# Patient Record
Sex: MALE
[De-identification: names, ages, dates, MRNs, and addresses within clinical notes are randomized per-mention and may not be internally consistent; named-entity substitution may affect disease eponyms.]

---

## 2022-06-16 ENCOUNTER — HOSPITAL ENCOUNTER (OUTPATIENT)
Dept: HOSPITAL 97 - 2ND | Age: 67
Setting detail: OBSERVATION
LOS: 1 days | Discharge: HOME | End: 2022-06-17
Attending: INTERNAL MEDICINE | Admitting: INTERNAL MEDICINE
Payer: SELF-PAY

## 2022-06-16 VITALS — OXYGEN SATURATION: 92 %

## 2022-06-16 VITALS — BODY MASS INDEX: 31.8 KG/M2

## 2022-06-16 DIAGNOSIS — Z82.49: ICD-10-CM

## 2022-06-16 DIAGNOSIS — E78.2: ICD-10-CM

## 2022-06-16 DIAGNOSIS — Z79.82: ICD-10-CM

## 2022-06-16 DIAGNOSIS — N40.0: ICD-10-CM

## 2022-06-16 DIAGNOSIS — M25.062: Primary | ICD-10-CM

## 2022-06-16 DIAGNOSIS — M25.462: ICD-10-CM

## 2022-06-16 DIAGNOSIS — Z87.891: ICD-10-CM

## 2022-06-16 DIAGNOSIS — M23.222: ICD-10-CM

## 2022-06-16 DIAGNOSIS — Z95.1: ICD-10-CM

## 2022-06-16 DIAGNOSIS — S83.412A: ICD-10-CM

## 2022-06-16 DIAGNOSIS — I25.10: ICD-10-CM

## 2022-06-16 DIAGNOSIS — Z85.828: ICD-10-CM

## 2022-06-16 DIAGNOSIS — Z20.822: ICD-10-CM

## 2022-06-16 DIAGNOSIS — Z91.041: ICD-10-CM

## 2022-06-16 DIAGNOSIS — Z88.6: ICD-10-CM

## 2022-06-16 DIAGNOSIS — Z80.0: ICD-10-CM

## 2022-06-16 DIAGNOSIS — Z79.899: ICD-10-CM

## 2022-06-16 DIAGNOSIS — I10: ICD-10-CM

## 2022-06-16 PROCEDURE — 80053 COMPREHEN METABOLIC PANEL: CPT

## 2022-06-16 PROCEDURE — 73721 MRI JNT OF LWR EXTRE W/O DYE: CPT

## 2022-06-16 PROCEDURE — 71046 X-RAY EXAM CHEST 2 VIEWS: CPT

## 2022-06-16 PROCEDURE — 36415 COLL VENOUS BLD VENIPUNCTURE: CPT

## 2022-06-16 PROCEDURE — 81003 URINALYSIS AUTO W/O SCOPE: CPT

## 2022-06-16 PROCEDURE — 85610 PROTHROMBIN TIME: CPT

## 2022-06-16 PROCEDURE — 73560 X-RAY EXAM OF KNEE 1 OR 2: CPT

## 2022-06-16 PROCEDURE — 85025 COMPLETE CBC W/AUTO DIFF WBC: CPT

## 2022-06-16 PROCEDURE — 85730 THROMBOPLASTIN TIME PARTIAL: CPT

## 2022-06-16 PROCEDURE — 84550 ASSAY OF BLOOD/URIC ACID: CPT

## 2022-06-16 RX ADMIN — DEXTROSE AND SODIUM CHLORIDE SCH MLS: 5; .9 INJECTION, SOLUTION INTRAVENOUS at 22:39

## 2022-06-16 RX ADMIN — MORPHINE SULFATE PRN MG: 2 INJECTION, SOLUTION INTRAMUSCULAR; INTRAVENOUS at 22:38

## 2022-06-17 VITALS — SYSTOLIC BLOOD PRESSURE: 129 MMHG | DIASTOLIC BLOOD PRESSURE: 66 MMHG | TEMPERATURE: 96.7 F

## 2022-06-17 LAB
ALBUMIN SERPL BCP-MCNC: 3 G/DL (ref 3.4–5)
ALP SERPL-CCNC: 75 U/L (ref 45–117)
ALT SERPL W P-5'-P-CCNC: 25 U/L (ref 12–78)
AST SERPL W P-5'-P-CCNC: 15 U/L (ref 15–37)
BUN BLD-MCNC: 26 MG/DL (ref 7–18)
GLUCOSE SERPLBLD-MCNC: 106 MG/DL (ref 74–106)
HCT VFR BLD CALC: 42.8 % (ref 39.6–49)
INR BLD: 1.03
LYMPHOCYTES # SPEC AUTO: 2.8 K/UL (ref 0.7–4.9)
PMV BLD: 7.4 FL (ref 7.6–11.3)
POTASSIUM SERPL-SCNC: 3.9 MMOL/L (ref 3.5–5.1)
RBC # BLD: 4.72 M/UL (ref 4.33–5.43)
UA DIPSTICK W REFLEX MICRO PNL UR: (no result)

## 2022-06-17 RX ADMIN — DEXTROSE AND SODIUM CHLORIDE SCH: 5; .9 INJECTION, SOLUTION INTRAVENOUS at 12:18

## 2022-06-17 RX ADMIN — MORPHINE SULFATE PRN MG: 2 INJECTION, SOLUTION INTRAMUSCULAR; INTRAVENOUS at 09:38

## 2022-06-17 RX ADMIN — MORPHINE SULFATE PRN MG: 2 INJECTION, SOLUTION INTRAMUSCULAR; INTRAVENOUS at 13:47

## 2022-06-17 NOTE — RAD REPORT
EXAM DESCRIPTION:  RAD - Chest Pa And Lat (2 Views) - 6/17/2022 5:26 am

 

CLINICAL HISTORY:  left knee derangement, chest pain, preprocedure exam

 

COMPARISON:  Two view chest 11/16/2020

 

TECHNIQUE:  Frontal and lateral views of the chest were obtained.

 

FINDINGS:  The lungs are clear.   Heart size is normal and central vasculature is within normal limit
s.  No pleural effusion or pneumothorax seen.  No acute bony finding noted.  No aortic abnormality.  
No significant change from comparison study.

 

IMPRESSION:  No acute cardiopulmonary process.

## 2022-06-17 NOTE — CON
Reason For Consultation:  Intractable knee pain.



History Of Present Illness:  Mr. Ramos is a 66-year-old gentleman with some comorbidities who has ha
d progressive knee pain after an injury, now unbearable in nature.  He was admitted last night for pa
in control.  We were asked to consult for this.  He is remaining n.p.o. pending any possible surgery.
  He is currently in the MRI scanner, which I am in agreement with obtaining the study.  He will proc
eed with recommendations based on MRI findings.  We will send a uric acid just on the off chance that
 he does have gout.  It is not uncommon that injury can precipitate a gouty arthropathy.





MALENA/MODL

DD:  06/17/2022 07:47:31Voice ID:  [id number]

DT:  06/17/2022 11:07:06Report ID:  [id number]

## 2022-06-17 NOTE — SS
Date of Discharge:  06/17/2022



Chief Complaint:  Left knee pain.



History Of Present Illness:  This is a 66-year-old very pleasant male patient, who unfortunately twis
jocelin his left knee while he was trying to get up from the chair and he got his foot tangled up over a 
cord and twisted his knee where he started to have significant pain in his left knee.  [facility] where he had an x-ray of the left knee and no fracture was found.  He was released 
to go home with Medrol Dosepak and tramadol as needed for pain.  After his visit to the emergency [facility], he came to see me and he was advised to take his medication as prescribed from the emergency room 
and come see me in a week for followup.  The patient came to office yesterday and reported that his p
ain was not improving and he started to have some swelling on the medial aspect of the left knee.  He
 has constant pain, but the pain tends to get worse with standing or walking.  His pain got worse yes
terday after I saw him and decision was made to admit him to hospital for further evaluation and benjamin
gement of this problem.  It is important to know that after calling multiple orthopedic specialists, 
we were not able to get any appointment for any orthopedic specialist to see him over next 3-4 weeks.
  I saw him in this hospital, he was sleeping, easily arousable, not in any distress.



Allergies:  TO CODEINE AND IODINE.



Medications:  

1.Amlodipine 10 mg daily.

2.Aspirin 325 mg daily.

3.Atorvastatin 80 mg daily.

4.Vitamin D3 1000 units daily.

5.Metoprolol tartrate 50 mg 2 times a day.

6.Magnesium oxide 400 mg daily.

7.Nitrostat p.r.n.

8.Potassium chloride 99 mg daily.

9.Sertraline 50 mg daily.



Review of Systems:

Musculoskeletal:  Left knee pain. 

All other systems reviewed and negative.



Past Medical History:  Significant for hypertension; hyperlipidemia, which is mixed; coronary artery 
disease; benign prostatic hypertrophy; basal cell carcinoma.



Past Surgical History:  Coronary artery bypass surgery done in July 2019.



Family History:  Father and mother both have hypertension.  Brother with history of colon cancer.



Social History:  Prior history of smoking, not at present time.  Use of alcohol, occasional.



Physical Examination:

Vital Signs:  His height 5 feet 5 inches, weight 191 pounds, temperature 97.5, pulse 52, respiratory 
rate 18, blood pressure 129/67, and oxygen saturation 95%. 

General:  Awake, alert, oriented, not in distress. 

HEENT:  Head atraumatic, normocephalic.  Conjunctivae nonerythematous.  Sclerae white.  Mouth, no thr
ush or edema noted.  Ears/Nose, no mass, lesion, discharge noted. 

Neck:  Supple. No JVD, lymph nodes, bruit, thyromegaly noted. 

Lungs:  Bilateral good equal air entry. Clear to auscultation. No rhonchi.  No rales. 

Heart:  Normal heart sounds, no murmur or gallop. 

Abdomen:  Soft, bowel sounds normal. No guarding, rigidity, tenderness, mass, hepatosplenomegaly, dis
tention, or bruit noted. 

Extremities:  No leg edema.  No calf tenderness. 

Skin:  No rash, ulcer, cellulitis. 

Lymphatics:  No lymph node enlargement in neck, supraclavicular, infraclavicular region. 

Neuro:  No focal neurological deficit. 

Chest:  Unremarkable. 

External Genitalia:  Deferred. 

Rectal:  Deferred. 

Musculoskeletal:  The patient has significant swelling of the left medial knee with painful range of 
motion.  He has large area of bruising involving left medial thigh in mid and lower 1/3rd aspect and 
also has some bruising on the left lower leg around the ankle and foot on the medial region.



Laboratory Data:  COVID-19 test negative.  Liver function tests unremarkable.  Urinalysis normal.  Wh
ite count 9.2, hemoglobin 15.1, platelets 288.  Sodium 137, potassium 3.9, chloride 107, bicarb 27, B
UN 26, creatinine 1.13, glucose 106.  PT and PTT normal.  Chest x-ray unremarkable.  Left knee x-ray,
 no acute traumatic injury.  MRI of the left knee done today shows medial collateral ligament tear is
 suspected, medial patellofemoral ligament tear cannot be excluded.  Large joint effusion.  Posterior
 horn midbody medial meniscus tear with no free or displaced meniscal tissue, posterior cruciate is s
uspected to be chronic.



Hospital Course:  After the patient was admitted to the hospital, orthopedic consultation was request
ed from Dr. Zarco and details were discussed with him.  After an MRI was done, Dr. Zarco review
ed MRI and recommended that the patient does not need any surgical intervention and he will not recom
mend any intra-articular steroid injection either because of increased risk of infection.  The patien
t probably has some hemarthrosis with this joint effusion from this trauma and conservative managemen
t was recommended by Dr. Zarco.  Details were discussed with the patient and there is no need for 
ongoing hospitalization now, so the patient was discharged to go home and I have instructed him to ap
ply ice off and on to the left knee and an ACE wrap was placed by nursing staff per order to the left
 knee and he was instructed to continue to use that and I will see him next week at office for follow
up.  The patient is requesting refill on his pain medication, tramadol 50 mg 4 times a day as needed 
for pain, 30 tablets prescription was sent to [facility] and I have also advised him
 to use meloxicam 15 mg by mouth daily and prescription was sent for 30 tablets.



Final Diagnoses:  

1.Hemarthrosis, left knee.

2.Medial collateral ligament tear, left knee.

3.Posterior horn medial meniscus tear.

4.Coronary artery disease.

5.Hypertension.

6.Hyperlipidemia.

7.Benign prostatic hypertrophy.





MINO/MODL

DD:  06/17/2022 15:25:30Voice ID:  [id number]

DT:  06/17/2022 19:56:24Report ID:  [id number]

## 2022-06-17 NOTE — RAD REPORT
EXAM DESCRIPTION:  RAD - Knee Left 2 View - 6/17/2022 4:58 am

 

CLINICAL HISTORY:  Left knee derangement, knee pain

 

COMPARISON:  No comparisons

 

FINDINGS:  No fracture, dislocation or periosteal reaction.Small joint effusion is present. Slight na
rrowing of the lateral compartment noted. Minimal lateral compartment marginal spurs are present. Sma
ll spurs are present at the margins of the patella articular surface. Soft tissues along the anterior
 and medial margin knee joint are prominent with the baseline for the patient unknown. Surgical clips
 are seen presumed to be from pain harvesting. Arterial calcifications are present.

 

 

IMPRESSION:  Knee joint degenerative changes are present along with small joint effusion.

 

Clinical concerns for internal derangement or occult bony injury could be further assessed with MR im
aging.

## 2022-06-17 NOTE — RAD REPORT
EXAM DESCRIPTION:  MRI - Knee Left Wo Cont - 6/17/2022 8:09 am

 

CLINICAL HISTORY:  Anteromedial knee pain, recent knee twisting injury, no recent surgery

 

COMPARISON:  Two view left knee same date

 

TECHNIQUE:  Sagittal and axial PD and T2 fat sat sequences obtained along with coronal T1 and T2 fat 
sat sequences.

 

FINDINGS:  No occult fracture present. No marrow replacing process identifiable. Approximately 15 mil
limeter area of bone bruising or focal edema seen in the lateral most aspect of the lateral femoral c
ondyle. Adjacent cortex is intact. Small areas of subcortical degenerative cystic change seen along t
he undersurface of the medial and lateral tibial plateaus and the tibial spine. A 2.5 centimeter area
 of mild bone bruising seen in the posterior aspect of the lateral femoral condyle. Again, the adjace
nt cortex is intact.

 

Mild chondral edema noted in the lateral patella facet. Femoral chondromalacia changes are present. N
o full-thickness osteochondral defect identified.

 

Anterior cruciate ligament is intact. There is a full-thickness posterior cruciate ligament tear. Dis
siobhan fibers are not well visualized. This could be a chronic PCL injury. No lateral collateral ligamen
t tear seen. Medial collateral ligament tear is evident involving proximal fibers anterior margin.

 

Quadriceps tendon and tendon insertion to the patella are unremarkable. Patella tendon, origin and in
sertion are normal.

 

Horizontal tear in the posterior horn and mid body of the medial meniscus. Tear extends to the inferi
or articular surface near the free edge. No free or displaced meniscal tissue. Lateral meniscus degen
erative signal is present without a tear confirmed.

 

A large joint effusion is present. There are hypointense T1 and T2 strands throughout the joint effus
ion with fluid extending into the medial soft tissues adjacent to the medial tibial plateau and media
l femoral condyle. Medial patellofemoral ligament cannot be confirmed as intact.

 

IMPRESSION:  Medial collateral ligament tear is suspected along the anterior margin of the proximal f
ibers. This appears to involve both superficial and deep fibers.

 

Medial patellofemoral ligament tear cannot be excluded.

 

Large joint effusion with fluid extending into the soft tissues along the medial margin of the medial
 femoral condyle and medial tibial plateau. Hemorrhagic debris within the joint effusion suspected.

 

Posterior horn - mid body medial meniscus tear with no free or displaced meniscal tissue.

 

Posterior cruciate ligament tear is suspected to be chronic.

## 2023-08-13 ENCOUNTER — HOSPITAL ENCOUNTER (EMERGENCY)
Dept: HOSPITAL 97 - ER | Age: 68
Discharge: ANOTHER HOSPITAL | End: 2023-08-13
Payer: COMMERCIAL

## 2023-08-13 VITALS — OXYGEN SATURATION: 99 %

## 2023-08-13 VITALS — SYSTOLIC BLOOD PRESSURE: 148 MMHG | DIASTOLIC BLOOD PRESSURE: 74 MMHG

## 2023-08-13 VITALS — TEMPERATURE: 97.3 F

## 2023-08-13 DIAGNOSIS — Z79.82: ICD-10-CM

## 2023-08-13 DIAGNOSIS — R07.9: ICD-10-CM

## 2023-08-13 DIAGNOSIS — Z91.048: ICD-10-CM

## 2023-08-13 DIAGNOSIS — I10: ICD-10-CM

## 2023-08-13 DIAGNOSIS — Z98.61: ICD-10-CM

## 2023-08-13 DIAGNOSIS — I72.4: Primary | ICD-10-CM

## 2023-08-13 DIAGNOSIS — Z88.5: ICD-10-CM

## 2023-08-13 LAB
ALBUMIN SERPL BCP-MCNC: 3.4 G/DL (ref 3.4–5)
ALP SERPL-CCNC: 74 U/L (ref 45–117)
ALT SERPL W P-5'-P-CCNC: 21 U/L (ref 16–61)
AST SERPL W P-5'-P-CCNC: 20 U/L (ref 15–37)
BILIRUB INDIRECT SERPL-MCNC: 0.7 MG/DL (ref 0.2–0.8)
BUN BLD-MCNC: 21 MG/DL (ref 7–18)
GLUCOSE SERPLBLD-MCNC: 117 MG/DL (ref 74–106)
HCT VFR BLD CALC: 31.8 % (ref 39.6–49)
INR BLD: 1.13
LYMPHOCYTES # SPEC AUTO: 2.2 K/UL (ref 0.7–4.9)
MAGNESIUM SERPL-MCNC: 2 MG/DL (ref 1.6–2.4)
MCV RBC: 89.7 FL (ref 80–100)
NT-PROBNP SERPL-MCNC: 345 PG/ML (ref ?–125)
PMV BLD: 8.1 FL (ref 7.6–11.3)
POTASSIUM SERPL-SCNC: 3.5 MEQ/L (ref 3.5–5.1)
RBC # BLD: 3.55 M/UL (ref 4.33–5.43)
TROPONIN I SERPL HS-MCNC: 12.8 PG/ML (ref ?–58.9)
WBC # BLD AUTO: 8.5 THOU/UL (ref 4.3–10.9)

## 2023-08-13 PROCEDURE — 80048 BASIC METABOLIC PNL TOTAL CA: CPT

## 2023-08-13 PROCEDURE — 80076 HEPATIC FUNCTION PANEL: CPT

## 2023-08-13 PROCEDURE — 83880 ASSAY OF NATRIURETIC PEPTIDE: CPT

## 2023-08-13 PROCEDURE — 71045 X-RAY EXAM CHEST 1 VIEW: CPT

## 2023-08-13 PROCEDURE — 36415 COLL VENOUS BLD VENIPUNCTURE: CPT

## 2023-08-13 PROCEDURE — 85610 PROTHROMBIN TIME: CPT

## 2023-08-13 PROCEDURE — 93971 EXTREMITY STUDY: CPT

## 2023-08-13 PROCEDURE — 74176 CT ABD & PELVIS W/O CONTRAST: CPT

## 2023-08-13 PROCEDURE — 99285 EMERGENCY DEPT VISIT HI MDM: CPT

## 2023-08-13 PROCEDURE — 93926 LOWER EXTREMITY STUDY: CPT

## 2023-08-13 PROCEDURE — 93005 ELECTROCARDIOGRAM TRACING: CPT

## 2023-08-13 PROCEDURE — 83735 ASSAY OF MAGNESIUM: CPT

## 2023-08-13 PROCEDURE — 85025 COMPLETE CBC W/AUTO DIFF WBC: CPT

## 2023-08-13 PROCEDURE — 84484 ASSAY OF TROPONIN QUANT: CPT

## 2023-08-13 NOTE — RAD REPORT
EXAM DESCRIPTION:  Jake Single View8/13/2023 3:53 pm

 

CLINICAL HISTORY:  Chest pain

 

COMPARISON:  2022

 

FINDINGS:   The lungs appear clear of acute infiltrate. The heart is normal size.

 

Postsurgical changes involve the chest

 

IMPRESSION:  No acute abnormalities displayed

## 2023-08-13 NOTE — RAD REPORT
EXAM DESCRIPTION:  CT - Abdomen   Pelvis Wo Contrast - 8/13/2023 5:11 pm

 

CLINICAL HISTORY:  Abdominal  pain /right groin pain

 

COMPARISON:  2010

 

TECHNIQUE:  Computed axial tomography of the abdomen and pelvis was obtained. IV and oral contrast we
re not requested.

 

All CT scans are performed using dose optimization technique as appropriate and may include automated
 exposure control or mA/KV adjustment according to patient size.

 

FINDINGS:   The evaluation of solid organs, vessels and bowel is limited secondary to the lack of con
trast administration.

 

Liver, pancreas and adrenals grossly normal

 

Curvilinear calcification splenic capsule unchanged from prior exam. Spleen grossly normal.

 

Multiple, bilateral renal calculi. Mild right hydronephrosis. 8 millimeter calculus distal right uret
er.

 

No evidence diverticulitis. Normal appendix

 

Stranding right inguinal region status post recent catheterization. 2 centimeter round structure ante
rior to the right common femoral artery

 

Small umbilical hernia. Spondylosis lumbar spine

 

IMPRESSION:  8 millimeter calculus distal right ureter resulting in mild right hydronephrosis

 

2 centimeter round structure right inguinal region is shown to represent a pseudo aneurysm on a vascu
lar ultrasound August 13, 2023

## 2023-08-13 NOTE — RAD REPORT
EXAM DESCRIPTION:  US - Lower Extremity Artery Uni Ltd - 8/13/2023 4:42 pm

 

CLINICAL HISTORY:  Leg pain

 

COMPARISON:  None

 

FINDINGS:

2 centimeter vascular structure lies anterior to right common femoral artery having the appearance of
 a pseudo aneurysm.

 

The right common femoral, superficial femoral, right popliteal, right dorsalis pedis and right poster
ior tibial arteries are patent demonstrating monophasic waveforms

 

Grayscale, color and spectral analysis performed on all vessels

 

IMPRESSION:

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

## 2023-08-13 NOTE — RAD REPORT
EXAM DESCRIPTION:  USExtremjose ramon Venous Uni Ltd8/13/2023 4:42 pm

 

CLINICAL HISTORY:  Right leg pain and swelling.

 

COMPARISON:  None.

 

FINDINGS:  Right common femoral, superficial femoral, greater saphenous, popliteal and right posterio
r tibial veins are compressible and demonstrate augmentation. Doppler demonstrates good flow.

 

Grayscale, color and spectral analysis performed on all vessels

 

IMPRESSION:  No evidence of deep venous thrombosis involving the right lower extremity.

## 2023-08-13 NOTE — EDPHYS
Physician Documentation                                                                           

 [facility]                                                                 

Name: Walter Ramos                                                                               

Age: 67 yrs                                                                                       

Sex: Male                                                                                         

: 1955                                                                                   

MRN: [id number]                                                                                   

Arrival Date: 2023                                                                          

Time: 14:57                                                                                       

Account#: [id number]                                                                            

Bed 7                                                                                             

Private MD:                                                                                       

ED Physician Devonte Liang                                                                       

HPI:                                                                                              

                                                                                             

16:29 This 67 yrs old Male presents to ER via Ambulatory with complaints of Pelvic Pain, Leg  sb4 

      Pain, Hip Pain.                                                                             

16:29 Patient had a cardiac catheterization done at [facility] 3 days ago. He states  sb4 

      they first attempted the radial artery and were unsuccessful and then attempted the         

      femoral artery and again were unsuccessful. He states that when they pulled the line,       

      they had to hold pressure for almost an hour to control the bleeding. He was observed       

      overnight and discharged home on Plavix. He states that the bruising on his leg has         

      slowly gotten worse. It is on his entire medial right thigh. He endorses pain in the        

      area and does endorse some chest pain and dyspnea on exertion.                              

                                                                                                  

Historical:                                                                                       

- Allergies:                                                                                      

15:16 Iodine;                                                                                 ph  

15:16 Codeine;                                                                                ph  

- Home Meds:                                                                                      

15:16 metoprolol tartrate 50 mg Oral tablet 2 times per day [Active]; sertraline 100 mg oral  ph  

      tablet daily [Active]; amlodipine 10 mg tablet daily [Active]; An Low Dose Aspirin       

      81 mg oral tablet, delayed release (enteric coated) once [Active]; Potassium Chloride       

      Oral [Active]; clopidogrel 75 mg oral tablet daily [Active];                                

- PMHx:                                                                                           

15:16 Hypertensive disorder;                                                                  ph  

                                                                                                  

- Immunization history:: Adult Immunizations unknown.                                             

- Social history:: Smoking status: Patient denies any tobacco usage or history of.                

                                                                                                  

                                                                                                  

ROS:                                                                                              

16:29 Constitutional: Negative for fever, chills, and weight loss, Neuro: Negative for        sb4 

      headache, weakness, numbness, tingling, and seizure.                                        

16:29 Cardiovascular: Positive for chest pain.                                                    

16:29 Respiratory: Positive for dyspnea on exertion.                                              

16:29 Skin: Positive for ecchymosis, of the right quadriceps and right upper thigh and medial     

      aspect of right thigh and right inner thigh and right hamstring and right gluteal fold      

      and right femoral area.                                                                     

16:29 All other systems are negative.                                                             

                                                                                                  

Exam:                                                                                             

16:17 ECG was reviewed by the Attending Physician.                                            sb4 

16:33 Constitutional:  This is a well developed, well nourished patient who is awake, alert,  sb4 

      and in no acute distress. Head/Face:  Normocephalic, atraumatic. Eyes:  Extra-ocular        

      motions intact.  Periorbital areas with no swelling, redness, or edema. ENT:  Mucous        

      membranes moist. Cardiovascular:  Regular rate and rhythm with a normal S1 and S2.          

      Respiratory:  Lungs have equal breath sounds bilaterally, clear to auscultation and         

      percussion.  No rales, rhonchi or wheezes noted.  No increased work of breathing, no        

      retractions or nasal flaring. Abdomen/GI:  Soft, non-tender, no distension. MS/             

      Extremity:  Pulses equal, no cyanosis.  Neurovascular intact.  Full, normal range of        

      motion. Neuro:  Awake and alert, GCS 15, oriented to person, place, time, and               

      situation.  Cranial nerves II-XII grossly intact.  Motor strength 5/5 in all                

      extremities.  Sensory grossly intact.  Cerebellar exam normal.  Normal gait.                

16:33 Skin: Appearance: ecchymosis, noted on the, groin and right quadriceps and right upper      

      thigh and medial aspect of right thigh and right inner thigh and right hamstring and        

      right gluteal fold and right femoral area, that are moderate.                               

                                                                                                  

Vital Signs:                                                                                      

15:13  / 77; Pulse 58; Resp 18; Temp 97.3; Pulse Ox 100% on R/A; Weight 91.63 kg;       ph  

      Height 5 ft. 7 in. ;                                                                        

16:00  / 75; Pulse 59; Resp 18; Pulse Ox 98% on R/A;                                    ph  

17:30  / 78; Pulse 57; Resp 16; Pulse Ox 98% on R/A;                                    ph  

18:48  / 81; Pulse 55; Resp 16; Pulse Ox 99% on R/A;                                    ph  

19:45  / 74; Pulse 56; Resp 16; Pulse Ox 99% on R/A;                                    jb4 

20:19  / 74; Pulse 51; Resp 18; Pulse Ox 99% on R/A;                                    jb4 

15:13 Body Mass Index 31.64 (91.63 kg, 170.18 cm)                                             ph  

                                                                                                  

MDM:                                                                                              

15:06 Patient medically screened.                                                             sb4 

17:43 Data reviewed: vital signs, nurses notes, lab test result(s), radiologic studies, I     sb4 

      have discussed the patient's presentation/case with the attending Emergency Department      

      Physician;. Historians other than the Patient: Spouse/Significant Other: wife. Care         

      significantly affected by the following chronic conditions: Hypertension, CAD.              

      Counseling: I had a detailed discussion with the patient and/or guardian regarding: the     

      historical points, exam findings, and any diagnostic results supporting the                 

      discharge/admit diagnosis, lab results, radiology results, the need to transfer to          

      another facility, for higher level of care, [facility] does not           

      immediately have the required specialist.                                                   

19:33 Management of patient was discussed with the following: Hospitalist: Hospitalist at     [address]                                                                                  

                                                                                                  

                                                                                             

15:19 Order name: Basic Metabolic Panel; Complete Time: 16:20                                 [facility] 

                                                                                             

15:19 Order name: CBC with Diff; Complete Time: 16:20                                         [facility] 

                                                                                             

15:19 Order name: LFT's; Complete Time: 16:20                                                 [facility] 

                                                                                             

15:19 Order name: Magnesium; Complete Time: 16:20                                             [facility] 

                                                                                             

15:19 Order name: NT PRO-BNP; Complete Time: 16:20                                            [facility] 

                                                                                             

15:19 Order name: PT-INR; Complete Time: 16:20                                                [facility] 

                                                                                             

15:19 Order name: Troponin HS; Complete Time: 16:20                                           [facility] 

                                                                                             

15:19 Order name: XRAY Chest (1 view); Complete Time: 16:26                                   [facility] 

                                                                                             

15:19 Order name: Extremity Venous Uni Ltd US; Complete Time: 17:31                           [facility] 

                                                                                             

15:19 Order name: Lower Extremity Artery Uni Ltd US; Complete Time: 17:31                     [facility] 

                                                                                             

16:26 Order name: CT Abd/Pelvis - Without Contrast; Complete Time: 17:34                      4 

                                                                                             

15:19 Order name: EKG; Complete Time: 15:20                                                   [facility] 

                                                                                             

15:19 Order name: Cardiac monitoring; Complete Time: 15:48                                    [facility] 

                                                                                             

15:19 Order name: EKG - Nurse/Tech; Complete Time: 15:48                                      [facility] 

                                                                                             

15:19 Order name: IV Saline Lock; Complete Time: 15:48                                        [facility] 

                                                                                             

15:19 Order name: Labs collected and sent; Complete Time: 15:48                               [facility] 

                                                                                             

15:19 Order name: O2 Per Protocol; Complete Time: 15:48                                       [facility] 

                                                                                             

15:19 Order name: O2 Sat Monitoring; Complete Time: 15:48                                     sb4 

                                                                                                  

EC:17 Rate is 59 beats/min. Rhythm is regular, Sinus bradycardia with Right bundle branch     sb4 

      block. [place] interval is prolonged at 210 msec. QRS interval is normal at 98 msec. QT          

      interval is normal at 426 msec. Interpreted by me. Reviewed by me.                          

                                                                                                  

Administered Medications:                                                                         

No medications were administered                                                                  

                                                                                                  

                                                                                                  

Disposition Summary:                                                                              

23 17:44                                                                                    

Transfer Ordered                                                                                  

      Transfer Location: [facility]                                                     sb4 

      Reason: Higher level of care                                                            sb4 

      Condition: Fair                                                                         sb4 

      Problem: new                                                                            sb4 

      Symptoms: are unchanged                                                                 sb4 

      Accepting Physician: Dr. Vivas(23 20:31)                                          jb4 

      Diagnosis                                                                                   

        - pseudoaneursym of right femoral artery, 2 cm, s/p cardiac catheterization           sb4 

      Forms:                                                                                      

        - Medication Reconciliation Form                                                      sb4 

        - SBAR form                                                                           sb4 

Signatures:                                                                                       

Dispatcher [employer]                           EDCherelle Alfred RN RN ph Bryson, James, RN                       RN   jbRuby Alicia PA-C PA-C sb4                                                  

                                                                                                  

Corrections: (The following items were deleted from the chart)                                    

15:19 15:16 Allergies: clopidogrel; ph                                                        ph  

16:19 16:17 Rate is 59 beats/min. Rhythm is regular, Sinus bradycardia with Right bundle      sb4 

      branch block. [place] interval is prolonged at 210 msec. QRS interval is normal at 98 msec.      

      QT interval is normal at 426 msec. Clinical impression: Abnormal EKG without                

      significant change. Interpreted by me. Reviewed by me. sb4                                  

19:33 17:44 Dr. Amanda sb4                                                                      sb4 

20:31 19:33 Dr. Vivas sb4                                                                     jb4 

                                                                                                  

**************************************************************************************************

## 2023-08-13 NOTE — ER
Nurse's Notes                                                                                     

 [facility]                                                                 

Name: Walter Ramos                                                                               

Age: 67 yrs                                                                                       

Sex: Male                                                                                         

: 1955                                                                                   

MRN: [id number]                                                                                   

Arrival Date: 2023                                                                          

Time: 14:57                                                                                       

Account#: [id number]                                                                            

Bed 7                                                                                             

Private MD:                                                                                       

Diagnosis: pseudoaneursym of right femoral artery, 2 cm, s/p cardiac catheterization              

                                                                                                  

Presentation:                                                                                     

                                                                                             

15:13 Chief complaint: Patient states: Had procedure on Thursday for cardiac stent placement, ph  

      catheterized to R groin, c/o pain and bruising to area. Coronavirus screen: Vaccine         

      status: Patient reports receiving the 2nd dose of the covid vaccine. Ebola Screen: No       

      symptoms or risks identified at this time. Initial Sepsis Screen: Does the patient meet     

      any 2 criteria? No. Patient's initial sepsis screen is negative. Does the patient have      

      a suspected source of infection? No. Patient's initial sepsis screen is negative. Risk      

      Assessment: Do you want to hurt yourself or someone else? Patient reports no desire to      

      harm self or others. Onset of symptoms was 2023.                                 

15:13 Method Of Arrival: Ambulatory                                                           ph  

15:13 Acuity: OREN 3                                                                           ph  

                                                                                                  

Triage Assessment:                                                                                

15:19 General: Appears in no apparent distress. Behavior is calm, cooperative, appropriate    ph  

      for age. Pain: Complains of pain in right femoral area. Neuro: Level of Consciousness       

      is awake, alert, obeys commands, Oriented to person, place, time, situation.                

      Cardiovascular: Capillary refill < 3 seconds in bilateral fingers Patient's skin is         

      warm and dry. Respiratory: Airway is compromised Respiratory effort is even, unlabored.     

      Derm: Bruising that is dark purple, on right gluteal fold, right hamstring, right inner     

      thigh, medial aspect of right thigh, right upper thigh and right quadriceps.                

      Musculoskeletal: Range of motion: intact in all extremities.                                

                                                                                                  

Historical:                                                                                       

- Allergies:                                                                                      

15:16 Iodine;                                                                                 ph  

15:16 Codeine;                                                                                ph  

- Home Meds:                                                                                      

15:16 metoprolol tartrate 50 mg Oral tablet 2 times per day [Active]; sertraline 100 mg oral  ph  

      tablet daily [Active]; amlodipine 10 mg tablet daily [Active]; An Low Dose Aspirin       

      81 mg oral tablet, delayed release (enteric coated) once [Active]; Potassium Chloride       

      Oral [Active]; clopidogrel 75 mg oral tablet daily [Active];                                

- PMHx:                                                                                           

15:16 Hypertensive disorder;                                                                  ph  

                                                                                                  

- Immunization history:: Adult Immunizations unknown.                                             

- Social history:: Smoking status: Patient denies any tobacco usage or history of.                

                                                                                                  

                                                                                                  

Screening:                                                                                        

15:29 [facility] ED Fall Risk Assessment (Adult) History of falling in the last 3 months,       ph  

      including since admission No falls in past 3 months (0 pts) Confusion or Disorientation     

      No (0 pts) Intoxicated or Sedated No (0 pts) Impaired Gait No (0 pts) Mobility Assist       

      Device Used No (0 pt) Altered Elimination No (0 pt) Score/Fall Risk Level 0 - 2 = Low       

      Risk Oriented to surroundings, Maintained a safe environment, Hourly rounding (assess       

      needs \T\ fall precautionary measures) done. Abuse screen: Denies threats or abuse.         

      Denies injuries from another. Nutritional screening: No deficits noted. Tuberculosis        

      screening: No symptoms or risk factors identified.                                          

                                                                                                  

Assessment:                                                                                       

15:40 Reassessment: Patient is alert, oriented x 3, equal unlabored respirations, skin        aa5 

      warm/dry/pink.                                                                              

15:59 Reassessment: US at bedside .                                                           aa5 

19:00 Reassessment: Patient appears in no apparent distress at this time. Patient and/or      jb4 

      family updated on plan of care and expected duration. Pain level reassessed. Patient is     

      alert, oriented x 3, equal unlabored respirations, skin warm/dry/pink.                      

20:00 Reassessment: Patient appears in no apparent distress at this time. Patient and/or      jb4 

      family updated on plan of care and expected duration. Pain level reassessed. Patient is     

      alert, oriented x 3, equal unlabored respirations, skin warm/dry/pink.                      

                                                                                                  

Vital Signs:                                                                                      

15:13  / 77; Pulse 58; Resp 18; Temp 97.3; Pulse Ox 100% on R/A; Weight 91.63 kg;       ph  

      Height 5 ft. 7 in. ;                                                                        

16:00  / 75; Pulse 59; Resp 18; Pulse Ox 98% on R/A;                                    ph  

17:30  / 78; Pulse 57; Resp 16; Pulse Ox 98% on R/A;                                    ph  

18:48  / 81; Pulse 55; Resp 16; Pulse Ox 99% on R/A;                                    ph  

19:45  / 74; Pulse 56; Resp 16; Pulse Ox 99% on R/A;                                    jb4 

20:19  / 74; Pulse 51; Resp 18; Pulse Ox 99% on R/A;                                    jb4 

15:13 Body Mass Index 31.64 (91.63 kg, 170.18 cm)                                             ph  

                                                                                                  

ED Course:                                                                                        

14:59 Patient arrived in ED.                                                                  rg4 

15:06 Ruby Godoy PA-C is PHCP.                                                            sb4 

15:06 Devonte Liang MD is Attending Physician.                                              sb4 

15:15 Triage completed.                                                                       ph  

15:28 Arm band placed on Patient placed in an exam room, on a stretcher.                      ph  

15:29 Patient has correct armband on for positive identification. Placed in gown. Bed in low  ph  

      position. Side rails up X2. Client placed on continuous cardiac and pulse oximetry          

      monitoring. NIBP monitoring applied.                                                        

15:33 Cherelle Coy, RN is Primary Nurse.                                                    ph  

15:40 Initial lab(s) drawn, by me, sent to lab. Inserted saline lock: 20 gauge in right       aa5 

      wrist, using aseptic technique. Blood collected.                                            

15:55 XRAY Chest (1 view) In Process Unspecified.                                             EDMS

16:44 Extremity Venous Uni Ltd US In Process Unspecified.                                     EDMS

16:44 Lower Extremity Artery Uni Ltd US In Process Unspecified.                               EDMS

17:13 CT Abd/Pelvis - Without Contrast In Process Unspecified.                                EDMS

18:11 Transfer request with [facility] initiated. Abbie, JARROD spoke to [facility].                   mb4 

19:01 No provider procedures requiring assistance completed. Patient admitted, IV remains in  ph  

      place.                                                                                      

19:16 Lianet connected with Dr. Mariee for doc-to-doc.                                          mb4 

19:31 facesheet faxed to [facility] per request.                                               mb4 

20:05 Spoke to Hewett with [facility] for transfer. ETA 25-30min.                        mb4 

                                                                                                  

Administered Medications:                                                                         

No medications were administered                                                                  

                                                                                                  

                                                                                                  

Medication:                                                                                       

15:29 VIS not applicable for this client.                                                     ph  

                                                                                                  

Outcome:                                                                                          

17:44 ER care complete, transfer ordered by MD.                                               sb4 

20:30 Transferred by ground EMS to [facility], Transfer form completed. X-rays    jb4 

      sent w/ patient.                                                                            

20:30 Condition: stable                                                                           

20:30 Discharge instructions given to patient, Instructed on the need for transfer,               

      Demonstrated understanding of instructions.                                                 

20:31 Patient left the ED.                                                                    jb4 

                                                                                                  

Signatures:                                                                                       

Dispatcher [employer]                           Katty Callahan, RN                     RN   aa5                                                  

Cherelle Coy RN                      RN   Simi Badillo                                 rg4                                                  

Cristian Quigley RN                       RN   jb4                                                  

Lynette Coyle                            mb4                                                  

Salena Reynolds RN                      RN   kd3                                                  

Ruby Godoy PA-C PA-C sb4                                                  

                                                                                                  

Corrections: (The following items were deleted from the chart)                                    

15:19 15:16 Allergies: clopidogrel; ph                                                        ph  

20:30 20:19  / 74; Pulse 49bpm; Resp 18bpm; Pulse Ox 99% RA; kd3                        jb4 

                                                                                                  

**************************************************************************************************

## 2023-08-14 NOTE — EKG
Test Date:    2023-08-13               Test Time:    15:42:39

Technician:   PH                                     

                                                     

MEASUREMENT RESULTS:                                       

Intervals:                                           

Rate:         59                                     

AK:           210                                    

QRSD:         98                                     

QT:           426                                    

QTc:          421                                    

Axis:                                                

P:            72                                     

AK:           210                                    

QRS:          -10                                    

T:            29                                     

                                                     

INTERPRETIVE STATEMENTS:                                       

                                                     

Sinus bradycardia with 1st degree AV block

Incomplete right bundle branch block

Borderline ECG

Compared to ECG 09/11/1995 06:51:00

First degree AV block now present

Incomplete right bundle-branch block now present

Sinus rhythm no longer present



Electronically Signed On 08-14-23 13:09:44 CDT by Laurent Garcia